# Patient Record
Sex: FEMALE | Race: WHITE | NOT HISPANIC OR LATINO
[De-identification: names, ages, dates, MRNs, and addresses within clinical notes are randomized per-mention and may not be internally consistent; named-entity substitution may affect disease eponyms.]

---

## 2020-01-07 ENCOUNTER — RESULT REVIEW (OUTPATIENT)
Age: 40
End: 2020-01-07

## 2021-02-10 ENCOUNTER — APPOINTMENT (OUTPATIENT)
Dept: ANTEPARTUM | Facility: CLINIC | Age: 41
End: 2021-02-10
Payer: COMMERCIAL

## 2021-02-10 ENCOUNTER — ASOB RESULT (OUTPATIENT)
Age: 41
End: 2021-02-10

## 2021-02-10 PROBLEM — Z00.00 ENCOUNTER FOR PREVENTIVE HEALTH EXAMINATION: Status: ACTIVE | Noted: 2021-02-10

## 2021-02-10 PROCEDURE — 76811 OB US DETAILED SNGL FETUS: CPT

## 2021-02-10 PROCEDURE — 99072 ADDL SUPL MATRL&STAF TM PHE: CPT

## 2021-04-07 ENCOUNTER — APPOINTMENT (OUTPATIENT)
Dept: ANTEPARTUM | Facility: CLINIC | Age: 41
End: 2021-04-07

## 2021-04-16 ENCOUNTER — ASOB RESULT (OUTPATIENT)
Age: 41
End: 2021-04-16

## 2021-04-16 ENCOUNTER — APPOINTMENT (OUTPATIENT)
Dept: ANTEPARTUM | Facility: CLINIC | Age: 41
End: 2021-04-16
Payer: COMMERCIAL

## 2021-04-16 PROCEDURE — 76818 FETAL BIOPHYS PROFILE W/NST: CPT

## 2021-04-16 PROCEDURE — 99072 ADDL SUPL MATRL&STAF TM PHE: CPT

## 2021-04-29 ENCOUNTER — APPOINTMENT (OUTPATIENT)
Dept: ANTEPARTUM | Facility: CLINIC | Age: 41
End: 2021-04-29
Payer: COMMERCIAL

## 2021-04-29 ENCOUNTER — ASOB RESULT (OUTPATIENT)
Age: 41
End: 2021-04-29

## 2021-04-29 PROCEDURE — 76816 OB US FOLLOW-UP PER FETUS: CPT

## 2021-04-29 PROCEDURE — 99072 ADDL SUPL MATRL&STAF TM PHE: CPT

## 2021-04-29 PROCEDURE — 99212 OFFICE O/P EST SF 10 MIN: CPT | Mod: 25

## 2021-07-05 ENCOUNTER — TRANSCRIPTION ENCOUNTER (OUTPATIENT)
Age: 41
End: 2021-07-05

## 2021-07-05 ENCOUNTER — OUTPATIENT (OUTPATIENT)
Dept: OUTPATIENT SERVICES | Facility: HOSPITAL | Age: 41
LOS: 1 days | End: 2021-07-05
Payer: COMMERCIAL

## 2021-07-05 DIAGNOSIS — Z11.52 ENCOUNTER FOR SCREENING FOR COVID-19: ICD-10-CM

## 2021-07-05 LAB — SARS-COV-2 RNA SPEC QL NAA+PROBE: SIGNIFICANT CHANGE UP

## 2021-07-05 PROCEDURE — C9803: CPT

## 2021-07-05 PROCEDURE — U0003: CPT

## 2021-07-05 PROCEDURE — U0005: CPT

## 2021-07-06 ENCOUNTER — INPATIENT (INPATIENT)
Facility: HOSPITAL | Age: 41
LOS: 1 days | Discharge: HOME | End: 2021-07-08
Attending: OBSTETRICS & GYNECOLOGY | Admitting: OBSTETRICS & GYNECOLOGY
Payer: COMMERCIAL

## 2021-07-06 VITALS — HEART RATE: 108 BPM | DIASTOLIC BLOOD PRESSURE: 82 MMHG | SYSTOLIC BLOOD PRESSURE: 125 MMHG

## 2021-07-06 DIAGNOSIS — O36.63X0 MATERNAL CARE FOR EXCESSIVE FETAL GROWTH, THIRD TRIMESTER, NOT APPLICABLE OR UNSPECIFIED: ICD-10-CM

## 2021-07-06 LAB
BASOPHILS # BLD AUTO: 0.02 K/UL — SIGNIFICANT CHANGE UP (ref 0–0.2)
BASOPHILS NFR BLD AUTO: 0.2 % — SIGNIFICANT CHANGE UP (ref 0–2)
BLD GP AB SCN SERPL QL: NEGATIVE — SIGNIFICANT CHANGE UP
EOSINOPHIL # BLD AUTO: 0.02 K/UL — SIGNIFICANT CHANGE UP (ref 0–0.5)
EOSINOPHIL NFR BLD AUTO: 0.2 % — SIGNIFICANT CHANGE UP (ref 0–6)
HCT VFR BLD CALC: 34 % — LOW (ref 34.5–45)
HGB BLD-MCNC: 11.1 G/DL — LOW (ref 11.5–15.5)
IMM GRANULOCYTES NFR BLD AUTO: 1.2 % — SIGNIFICANT CHANGE UP (ref 0–1.5)
LYMPHOCYTES # BLD AUTO: 1.67 K/UL — SIGNIFICANT CHANGE UP (ref 1–3.3)
LYMPHOCYTES # BLD AUTO: 18.6 % — SIGNIFICANT CHANGE UP (ref 13–44)
MCHC RBC-ENTMCNC: 29.6 PG — SIGNIFICANT CHANGE UP (ref 27–34)
MCHC RBC-ENTMCNC: 32.6 GM/DL — SIGNIFICANT CHANGE UP (ref 32–36)
MCV RBC AUTO: 90.7 FL — SIGNIFICANT CHANGE UP (ref 80–100)
MONOCYTES # BLD AUTO: 0.52 K/UL — SIGNIFICANT CHANGE UP (ref 0–0.9)
MONOCYTES NFR BLD AUTO: 5.8 % — SIGNIFICANT CHANGE UP (ref 2–14)
NEUTROPHILS # BLD AUTO: 6.63 K/UL — SIGNIFICANT CHANGE UP (ref 1.8–7.4)
NEUTROPHILS NFR BLD AUTO: 74 % — SIGNIFICANT CHANGE UP (ref 43–77)
NRBC # BLD: 0 /100 WBCS — SIGNIFICANT CHANGE UP (ref 0–0)
PLATELET # BLD AUTO: 219 K/UL — SIGNIFICANT CHANGE UP (ref 150–400)
RBC # BLD: 3.75 M/UL — LOW (ref 3.8–5.2)
RBC # FLD: 15.9 % — HIGH (ref 10.3–14.5)
RH IG SCN BLD-IMP: POSITIVE — SIGNIFICANT CHANGE UP
WBC # BLD: 8.97 K/UL — SIGNIFICANT CHANGE UP (ref 3.8–10.5)
WBC # FLD AUTO: 8.97 K/UL — SIGNIFICANT CHANGE UP (ref 3.8–10.5)

## 2021-07-06 PROCEDURE — 59514 CESAREAN DELIVERY ONLY: CPT | Mod: AS,U9

## 2021-07-06 RX ORDER — CITRIC ACID/SODIUM CITRATE 300-500 MG
15 SOLUTION, ORAL ORAL ONCE
Refills: 0 | Status: COMPLETED | OUTPATIENT
Start: 2021-07-06 | End: 2021-07-06

## 2021-07-06 RX ORDER — OXYCODONE HYDROCHLORIDE 5 MG/1
5 TABLET ORAL ONCE
Refills: 0 | Status: DISCONTINUED | OUTPATIENT
Start: 2021-07-06 | End: 2021-07-08

## 2021-07-06 RX ORDER — HEPARIN SODIUM 5000 [USP'U]/ML
5000 INJECTION INTRAVENOUS; SUBCUTANEOUS EVERY 12 HOURS
Refills: 0 | Status: DISCONTINUED | OUTPATIENT
Start: 2021-07-06 | End: 2021-07-08

## 2021-07-06 RX ORDER — ACETAMINOPHEN 500 MG
975 TABLET ORAL
Refills: 0 | Status: DISCONTINUED | OUTPATIENT
Start: 2021-07-06 | End: 2021-07-08

## 2021-07-06 RX ORDER — OXYCODONE HYDROCHLORIDE 5 MG/1
5 TABLET ORAL
Refills: 0 | Status: DISCONTINUED | OUTPATIENT
Start: 2021-07-06 | End: 2021-07-08

## 2021-07-06 RX ORDER — SODIUM CHLORIDE 9 MG/ML
1000 INJECTION, SOLUTION INTRAVENOUS ONCE
Refills: 0 | Status: DISCONTINUED | OUTPATIENT
Start: 2021-07-06 | End: 2021-07-06

## 2021-07-06 RX ORDER — OXYTOCIN 10 UNIT/ML
333.33 VIAL (ML) INJECTION
Qty: 20 | Refills: 0 | Status: DISCONTINUED | OUTPATIENT
Start: 2021-07-06 | End: 2021-07-08

## 2021-07-06 RX ORDER — KETOROLAC TROMETHAMINE 30 MG/ML
30 SYRINGE (ML) INJECTION EVERY 6 HOURS
Refills: 0 | Status: COMPLETED | OUTPATIENT
Start: 2021-07-06 | End: 2021-07-08

## 2021-07-06 RX ORDER — TETANUS TOXOID, REDUCED DIPHTHERIA TOXOID AND ACELLULAR PERTUSSIS VACCINE, ADSORBED 5; 2.5; 8; 8; 2.5 [IU]/.5ML; [IU]/.5ML; UG/.5ML; UG/.5ML; UG/.5ML
0.5 SUSPENSION INTRAMUSCULAR ONCE
Refills: 0 | Status: DISCONTINUED | OUTPATIENT
Start: 2021-07-06 | End: 2021-07-08

## 2021-07-06 RX ORDER — IBUPROFEN 200 MG
600 TABLET ORAL EVERY 6 HOURS
Refills: 0 | Status: COMPLETED | OUTPATIENT
Start: 2021-07-06 | End: 2022-06-04

## 2021-07-06 RX ORDER — MAGNESIUM HYDROXIDE 400 MG/1
30 TABLET, CHEWABLE ORAL
Refills: 0 | Status: DISCONTINUED | OUTPATIENT
Start: 2021-07-06 | End: 2021-07-08

## 2021-07-06 RX ORDER — MORPHINE SULFATE 50 MG/1
0.1 CAPSULE, EXTENDED RELEASE ORAL ONCE
Refills: 0 | Status: DISCONTINUED | OUTPATIENT
Start: 2021-07-06 | End: 2021-07-07

## 2021-07-06 RX ORDER — SODIUM CHLORIDE 9 MG/ML
1000 INJECTION, SOLUTION INTRAVENOUS
Refills: 0 | Status: DISCONTINUED | OUTPATIENT
Start: 2021-07-06 | End: 2021-07-08

## 2021-07-06 RX ORDER — SODIUM CHLORIDE 9 MG/ML
1000 INJECTION, SOLUTION INTRAVENOUS
Refills: 0 | Status: DISCONTINUED | OUTPATIENT
Start: 2021-07-06 | End: 2021-07-06

## 2021-07-06 RX ORDER — LANOLIN
1 OINTMENT (GRAM) TOPICAL EVERY 6 HOURS
Refills: 0 | Status: DISCONTINUED | OUTPATIENT
Start: 2021-07-06 | End: 2021-07-08

## 2021-07-06 RX ORDER — SIMETHICONE 80 MG/1
80 TABLET, CHEWABLE ORAL EVERY 4 HOURS
Refills: 0 | Status: DISCONTINUED | OUTPATIENT
Start: 2021-07-06 | End: 2021-07-08

## 2021-07-06 RX ORDER — OXYTOCIN 10 UNIT/ML
333.33 VIAL (ML) INJECTION
Qty: 20 | Refills: 0 | Status: DISCONTINUED | OUTPATIENT
Start: 2021-07-06 | End: 2021-07-06

## 2021-07-06 RX ORDER — DIPHENHYDRAMINE HCL 50 MG
25 CAPSULE ORAL EVERY 6 HOURS
Refills: 0 | Status: DISCONTINUED | OUTPATIENT
Start: 2021-07-06 | End: 2021-07-08

## 2021-07-06 RX ORDER — FAMOTIDINE 10 MG/ML
20 INJECTION INTRAVENOUS ONCE
Refills: 0 | Status: COMPLETED | OUTPATIENT
Start: 2021-07-06 | End: 2021-07-06

## 2021-07-06 RX ADMIN — Medication 975 MILLIGRAM(S): at 22:01

## 2021-07-06 RX ADMIN — Medication 975 MILLIGRAM(S): at 22:45

## 2021-07-06 RX ADMIN — SODIUM CHLORIDE 125 MILLILITER(S): 9 INJECTION, SOLUTION INTRAVENOUS at 14:12

## 2021-07-06 RX ADMIN — FAMOTIDINE 20 MILLIGRAM(S): 10 INJECTION INTRAVENOUS at 14:12

## 2021-07-06 RX ADMIN — Medication 15 MILLILITER(S): at 14:12

## 2021-07-06 NOTE — OB PROVIDER H&P - PROBLEM SELECTOR PLAN 1
admit Guero&D  iv fluids  routine labs  npo bicitra  efm toco  anesthesia consult  abd prep and shave  graham to gravity   c.s for delivery  Dr Briceno in house aware

## 2021-07-06 NOTE — OB PROVIDER H&P - NSMATERNALFETALCONCERNS_OBGYN_ALL_OB_FT
Fetal Alert  5/30/21 Fetal pelvic mass, 2.89x2.29x3.45cm with a septation, female fetus, right hydronephrosis 1.4cm, left kidney normal.  Notify peds at delivery, will need evaluation of pelvic mass prior to hospital discharge. -Anne-Marie Lanza, RNC

## 2021-07-06 NOTE — OB RN DELIVERY SUMMARY - NSSELHIDDEN_OBGYN_ALL_OB_FT
[NS_DeliveryAttending1_OBGYN_ALL_OB_FT:[id number]] [NS_DeliveryAttending1_OBGYN_ALL_OB_FT:[id number]==],[NS_DeliveryAssist1_OBGYN_ALL_OB_FT:[id number]]

## 2021-07-06 NOTE — PRE-ANESTHESIA EVALUATION ADULT - NSANTHADDINFOFT_GEN_ALL_CORE
Spinal, duramorph explained to pt in detail;  risks include but not limited to HA, failure, nv injury.  All questions answered.

## 2021-07-06 NOTE — OB PROVIDER H&P - NSHPPHYSICALEXAM_GEN_ALL_CORE
Vital Signs Last 24 Hrs  T(C): --  T(F): --  HR: 112 (06 Jul 2021 12:26) (108 - 112)  BP: 125/82 (06 Jul 2021 12:12) (125/82 - 125/82)  BP(mean): --  RR: --  SpO2: 98% (06 Jul 2021 12:26) (98% - 99%)    gen- a&ox3 well developed well nourished nad  cv- rrr s1 s2 lungs cta bl  abd gravid soft  ext nonedemetous/nontender
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
## 2021-07-06 NOTE — OB RN INTRAOPERATIVE NOTE - NSSELHIDDEN_OBGYN_ALL_OB_FT
[NS_DeliveryAttending1_OBGYN_ALL_OB_FT:[id number]==] [NS_DeliveryAttending1_OBGYN_ALL_OB_FT:[id number]==],[NS_DeliveryAssist1_OBGYN_ALL_OB_FT:[id number]=]

## 2021-07-06 NOTE — OB NEONATOLOGY/PEDIATRICIAN DELIVERY SUMMARY - NSPEDSNEONOTESA_OBGYN_ALL_OB_FT
Pt called states that Seroquel is $720 does not have insurance   Is there anything else that can be called in that is more cost efficient for pt? Pediatrician called to delivery for primary  in the setting of fetal macrosomia. Female infant born at 40.6 wks via  to a 37 y/o  blood type O+ mother. Maternal history of SVT (not on medications) and GERD. No significant prenatal history reported. Prenatal labs nr/immune/-, GBS - on 6/3/21. ROM at birth on 21 with clear fluids. Two nuchal cords and no true knots. Baby emerged vigorous, crying. Cord clamping delayed 30sec. Infant was brought to radiant warmer and warmed, dried, stimulated and suctioned. HR>100, normal respiratory effort. APGARS of 8/8. Clear fluids were suctioned from infant’s pharynx x4 throughout resuscitation efforts. CPAP (21% FiO2 and 5cmH2O) was initiated ~5min of life for poor color and SpO2 in the 60-70’s. FiO2 was increased to 30% and 40% at ~7min and ~10min of life, respectively. Trial to decrease FiO2 initiated at ~14min of life was unsuccessful, with SpO2 in the low 80’s and increased work of breathing. Infant will be admitted to NICU for observation.     Loni Gaxiola, NICU NNP, was present and took the infant.     Mom is initiating breast feeding. Defers Hepatitis B vaccination. EOS score N/A as not labor and no ROM. Pediatrician called to delivery for primary  in the setting of fetal macrosomia. Female infant born at 40.6 wks via  to a 39 y/o  blood type O+ mother. Maternal history of SVT (not on medications) and GERD. On fetal U/S, pelvic mass with a septation (2.89x2.29x3.45cm), right hydronephrosis (1.4cm), left kidney normal. Will need evaluation of pelvic mass prior to hospital discharge. Prenatal labs nr/immune/-, GBS - on 6/3/21. ROM at birth on 21 with clear fluids. Two nuchal cords and no true knots. Baby emerged vigorous, crying. Cord clamping delayed 30sec. Infant was brought to radiant warmer and warmed, dried, stimulated and suctioned. HR>100, normal respiratory effort. APGARS of 8/8. Clear fluids were suctioned from infant’s pharynx x4 throughout resuscitation efforts. CPAP (21% FiO2 and 5cmH2O) was initiated ~5min of life for poor color and SpO2 in the 60-70’s. FiO2 was increased to 30% and 40% at ~7min and ~10min of life, respectively. Trial to decrease FiO2 initiated at ~14min of life was unsuccessful, with SpO2 in the low 80’s and increased work of breathing. Infant will be admitted to NICU for observation.     Loni Gaxiola, NICU NNP, was present and took the infant.     Mom is initiating breast feeding. Defers Hepatitis B vaccination. EOS score N/A as not labor and no ROM.

## 2021-07-06 NOTE — BRIEF OPERATIVE NOTE - OPERATION/FINDINGS
normal tubes, ovaries, and uterus  girl - apgars 8/8 stable to nicu  cephalic, clear fluid  9lb 6oz  EBL 1L  IVF 1.8L  uo 250cc clear yellow urine  surgicell powder along serosal edge of bladder flap

## 2021-07-06 NOTE — OB PROVIDER DELIVERY SUMMARY - NSPROVIDERDELIVERYNOTE_OBGYN_ALL_OB_FT
uncomplicated primary section  normal uterus, tubes and ovaries  girl = NICU stable apgars 8/8, 9lb 6oz. nuchal x2 reduced  EBL 1L  IVF 1.8L  uo 250cc clear yellow urine  2 grams ancef

## 2021-07-06 NOTE — OB PROVIDER H&P - ATTENDING COMMENTS
P1 at 40 + weeks with presumed macrosomia  on Leopolds 9lb 7oz  declined IOL and preferred sections  risks of surgery reviewed including not limited to bleeding infection/organ damage/transfusion/hyst/scar tissue    consents obtained  fetal status has been reassuring

## 2021-07-06 NOTE — OB NEONATOLOGY/PEDIATRICIAN DELIVERY SUMMARY - NSMATERNALFETALCONCERNS_OBGYN_ALL_OB_FT
Fetal Alert  5/30/21 Fetal pelvic mass, 2.89x2.29x3.45cm with a septation, female fetus, right hydronephrosis 1.4cm, left kidney normal.  Notify peds at delivery, will need evaluation of pelvic mass prior to hospital discharge. -Anne-Marie Lanza, RNC    Macrosomia

## 2021-07-06 NOTE — OB PROVIDER H&P - HISTORY OF PRESENT ILLNESS
39 yo  @40.6 weeks presenting for scheduled primary elective c/s pt denies reg cxns/lof/vb. pt report spos fm. pnc complicated by Shingles outbreak treated with Valtrex. last dose 21  efw by US 9#  allergies- nkda  meds- PNV  med hx- SVT - Dr Rocael Arechiga- cardiologist. last echo/ekg/stress test 2 years ago.    acid reflux  pt denies pulm/heme/neuro/psych    ob hx- current as above.   gyn hx- hx of ovarian cyst - denies tmt, hx abnl pap > 10 years ago with colposcopy WNL. pos HPV  sx hx- endoscopy - denies reaction to anesthesia  fam hx- mother > 49 yo Bt Ca, father- brain ca  social hx- denies etoh/tob/illicit drug use

## 2021-07-07 LAB
BASOPHILS # BLD AUTO: 0.01 K/UL — SIGNIFICANT CHANGE UP (ref 0–0.2)
BASOPHILS NFR BLD AUTO: 0.1 % — SIGNIFICANT CHANGE UP (ref 0–2)
COVID-19 SPIKE DOMAIN AB INTERP: NEGATIVE — SIGNIFICANT CHANGE UP
COVID-19 SPIKE DOMAIN ANTIBODY RESULT: 0.4 U/ML — SIGNIFICANT CHANGE UP
EOSINOPHIL # BLD AUTO: 0 K/UL — SIGNIFICANT CHANGE UP (ref 0–0.5)
EOSINOPHIL NFR BLD AUTO: 0 % — SIGNIFICANT CHANGE UP (ref 0–6)
HCT VFR BLD CALC: 28.7 % — LOW (ref 34.5–45)
HGB BLD-MCNC: 9.3 G/DL — LOW (ref 11.5–15.5)
IMM GRANULOCYTES NFR BLD AUTO: 0.4 % — SIGNIFICANT CHANGE UP (ref 0–1.5)
LYMPHOCYTES # BLD AUTO: 1.34 K/UL — SIGNIFICANT CHANGE UP (ref 1–3.3)
LYMPHOCYTES # BLD AUTO: 11 % — LOW (ref 13–44)
MCHC RBC-ENTMCNC: 30 PG — SIGNIFICANT CHANGE UP (ref 27–34)
MCHC RBC-ENTMCNC: 32.4 GM/DL — SIGNIFICANT CHANGE UP (ref 32–36)
MCV RBC AUTO: 92.6 FL — SIGNIFICANT CHANGE UP (ref 80–100)
MONOCYTES # BLD AUTO: 0.51 K/UL — SIGNIFICANT CHANGE UP (ref 0–0.9)
MONOCYTES NFR BLD AUTO: 4.2 % — SIGNIFICANT CHANGE UP (ref 2–14)
NEUTROPHILS # BLD AUTO: 10.24 K/UL — HIGH (ref 1.8–7.4)
NEUTROPHILS NFR BLD AUTO: 84.3 % — HIGH (ref 43–77)
NRBC # BLD: 0 /100 WBCS — SIGNIFICANT CHANGE UP (ref 0–0)
PLATELET # BLD AUTO: 219 K/UL — SIGNIFICANT CHANGE UP (ref 150–400)
RBC # BLD: 3.1 M/UL — LOW (ref 3.8–5.2)
RBC # FLD: 16 % — HIGH (ref 10.3–14.5)
SARS-COV-2 IGG+IGM SERPL QL IA: 0.4 U/ML — SIGNIFICANT CHANGE UP
SARS-COV-2 IGG+IGM SERPL QL IA: NEGATIVE — SIGNIFICANT CHANGE UP
T PALLIDUM AB TITR SER: NEGATIVE — SIGNIFICANT CHANGE UP
WBC # BLD: 12.15 K/UL — HIGH (ref 3.8–10.5)
WBC # FLD AUTO: 12.15 K/UL — HIGH (ref 3.8–10.5)

## 2021-07-07 RX ORDER — IBUPROFEN 200 MG
600 TABLET ORAL EVERY 6 HOURS
Refills: 0 | Status: DISCONTINUED | OUTPATIENT
Start: 2021-07-07 | End: 2021-07-08

## 2021-07-07 RX ADMIN — HEPARIN SODIUM 5000 UNIT(S): 5000 INJECTION INTRAVENOUS; SUBCUTANEOUS at 00:32

## 2021-07-07 RX ADMIN — Medication 975 MILLIGRAM(S): at 02:32

## 2021-07-07 RX ADMIN — Medication 975 MILLIGRAM(S): at 21:25

## 2021-07-07 RX ADMIN — Medication 30 MILLIGRAM(S): at 06:35

## 2021-07-07 RX ADMIN — Medication 30 MILLIGRAM(S): at 01:15

## 2021-07-07 RX ADMIN — Medication 975 MILLIGRAM(S): at 20:55

## 2021-07-07 RX ADMIN — Medication 30 MILLIGRAM(S): at 12:40

## 2021-07-07 RX ADMIN — Medication 975 MILLIGRAM(S): at 15:50

## 2021-07-07 RX ADMIN — Medication 30 MILLIGRAM(S): at 00:33

## 2021-07-07 RX ADMIN — Medication 30 MILLIGRAM(S): at 18:20

## 2021-07-07 RX ADMIN — Medication 30 MILLIGRAM(S): at 11:45

## 2021-07-07 RX ADMIN — Medication 975 MILLIGRAM(S): at 09:21

## 2021-07-07 RX ADMIN — Medication 30 MILLIGRAM(S): at 17:30

## 2021-07-07 RX ADMIN — HEPARIN SODIUM 5000 UNIT(S): 5000 INJECTION INTRAVENOUS; SUBCUTANEOUS at 11:45

## 2021-07-07 RX ADMIN — Medication 975 MILLIGRAM(S): at 14:57

## 2021-07-07 RX ADMIN — Medication 975 MILLIGRAM(S): at 03:10

## 2021-07-07 RX ADMIN — Medication 30 MILLIGRAM(S): at 06:06

## 2021-07-07 RX ADMIN — Medication 975 MILLIGRAM(S): at 10:20

## 2021-07-08 ENCOUNTER — TRANSCRIPTION ENCOUNTER (OUTPATIENT)
Age: 41
End: 2021-07-08

## 2021-07-08 VITALS
DIASTOLIC BLOOD PRESSURE: 58 MMHG | HEART RATE: 100 BPM | TEMPERATURE: 98 F | OXYGEN SATURATION: 98 % | SYSTOLIC BLOOD PRESSURE: 109 MMHG | RESPIRATION RATE: 18 BRPM

## 2021-07-08 PROCEDURE — 59025 FETAL NON-STRESS TEST: CPT

## 2021-07-08 PROCEDURE — 59050 FETAL MONITOR W/REPORT: CPT

## 2021-07-08 PROCEDURE — 86780 TREPONEMA PALLIDUM: CPT

## 2021-07-08 PROCEDURE — 85025 COMPLETE CBC W/AUTO DIFF WBC: CPT

## 2021-07-08 PROCEDURE — 86769 SARS-COV-2 COVID-19 ANTIBODY: CPT

## 2021-07-08 PROCEDURE — 86850 RBC ANTIBODY SCREEN: CPT

## 2021-07-08 PROCEDURE — C1889: CPT

## 2021-07-08 PROCEDURE — 86901 BLOOD TYPING SEROLOGIC RH(D): CPT

## 2021-07-08 PROCEDURE — 86900 BLOOD TYPING SEROLOGIC ABO: CPT

## 2021-07-08 RX ORDER — ACETAMINOPHEN 500 MG
3 TABLET ORAL
Qty: 0 | Refills: 0 | DISCHARGE
Start: 2021-07-08

## 2021-07-08 RX ORDER — IBUPROFEN 200 MG
1 TABLET ORAL
Qty: 0 | Refills: 0 | DISCHARGE
Start: 2021-07-08

## 2021-07-08 RX ADMIN — Medication 975 MILLIGRAM(S): at 09:06

## 2021-07-08 RX ADMIN — Medication 600 MILLIGRAM(S): at 00:51

## 2021-07-08 RX ADMIN — HEPARIN SODIUM 5000 UNIT(S): 5000 INJECTION INTRAVENOUS; SUBCUTANEOUS at 00:21

## 2021-07-08 RX ADMIN — Medication 600 MILLIGRAM(S): at 13:03

## 2021-07-08 RX ADMIN — Medication 600 MILLIGRAM(S): at 00:21

## 2021-07-08 NOTE — DISCHARGE NOTE OB - CARE PROVIDER_API CALL
Lorraine Briceno)  Obstetrics and Gynecology  [address], [address]  [place] [id number]  Phone: [phone]  Fax: [phone]  Follow Up Time:

## 2021-07-08 NOTE — PROGRESS NOTE ADULT - ATTENDING COMMENTS
Pt seen on AM rounds and resident note reviewed    Pt feels sore but ambulating, tolerating po    VSS AF  fundus firm, NT   slightly/softly distended  ext: trace edema,. no cords    A/P: POD 2 s/p primary  delivery, stable for discharge  -routine care  follow up in 1 week in office  Rosendo Wu MD
POD1 s/p 1'  section, doing well  -Routine postoperative care   -CBC not done, nursing made aware and will have it drawn  -Reg diet   -MONSTER Matt, DO

## 2021-07-08 NOTE — DISCHARGE NOTE OB - CARE PLAN
Principal Discharge DX:	 delivery delivered  Goal:	recover from surgery  Assessment and plan of treatment:	POD 2 s/p  , asymptomatic surgical blood loss anemia, stable for discharge

## 2021-07-08 NOTE — PROGRESS NOTE ADULT - ASSESSMENT
A/P: 39yo POD#2 s/p LTCS.  Patient is stable and doing well post-operatively.    - Continue regular diet.  - Increase ambulation.  - Continue motrin, tylenol, oxycodone PRN for pain control    MUSTAPHA Reyes PGY-1
  A/P: 39yo POD#1 s/p LTCS.  Patient is stable and doing well post-operatively.    - Continue regular diet.  - Increase ambulation.  - Continue motrin, tylenol, oxycodone PRN for pain control  - F/u AM CBC    MUSTAPHA Reyes  PGY-1

## 2021-07-08 NOTE — DISCHARGE NOTE OB - HOSPITAL COURSE
Patient was admitted to deliver her full term baby. She had a  delivery of female infant. She is stable for discharge on POD 2. She has asymptomatic anemia and will treat with iron supplementation.

## 2021-07-08 NOTE — DISCHARGE NOTE OB - PATIENT PORTAL LINK FT
You can access the FollowMyHealth Patient Portal offered by [facility] by registering at the following website: [email or web address]. By joining [facility]’s FollowMyHealth portal, you will also be able to view your health information using other applications (apps) compatible with our system.

## 2021-07-08 NOTE — DISCHARGE NOTE OB - CARE PROVIDERS DIRECT ADDRESSES
[email or web address]

## 2021-07-08 NOTE — PROGRESS NOTE ADULT - SUBJECTIVE AND OBJECTIVE BOX
Day 1 of Anesthesia Pain Management Service    SUBJECTIVE:  Pain Scale Score:          [X] Refer to charted pain scores    THERAPY: Received PF spinal morphine as above    OBJECTIVE:    Sedation:        	[X] Alert	[ ] Drowsy	[ ] Arousable      [ ] Asleep       [ ] Unresponsive    Side Effects:	[X] None	[ ] Nausea	[ ] Vomiting         [ ] Pruritus  		[ ] Weakness            [ ] Numbness	          [ ] Other:    ASSESSMENT/ PLAN  [X] Patient transitioned to prn analgesics  [X] Pain management per primary service, pain service to sign off   [X]Documentation and Verification of current medications
Day 1 of Anesthesia Pain Management Service    SUBJECTIVE: Doing ok  Pain Scale Score:          [X] Refer to charted pain scores    THERAPY: s/p  100 mcg PF morphine on 7\6\2021      MEDICATIONS  (STANDING):  acetaminophen   Tablet .. 975 milliGRAM(s) Oral <User Schedule>  diphtheria/tetanus/pertussis (acellular) Vaccine (ADAcel) 0.5 milliLiter(s) IntraMuscular once  heparin   Injectable 5000 Unit(s) SubCutaneous every 12 hours  ibuprofen  Tablet. 600 milliGRAM(s) Oral every 6 hours  ketorolac   Injectable 30 milliGRAM(s) IV Push every 6 hours  lactated ringers. 1000 milliLiter(s) (125 mL/Hr) IV Continuous <Continuous>  morphine PF Spinal 0.1 milliGRAM(s) IntraThecal. once  oxytocin Infusion 333.333 milliUNIT(s)/Min (1000 mL/Hr) IV Continuous <Continuous>    MEDICATIONS  (PRN):  diphenhydrAMINE 25 milliGRAM(s) Oral every 6 hours PRN Pruritus  lanolin Ointment 1 Application(s) Topical every 6 hours PRN Sore Nipples  magnesium hydroxide Suspension 30 milliLiter(s) Oral two times a day PRN Constipation  oxyCODONE    IR 5 milliGRAM(s) Oral every 3 hours PRN Moderate to Severe Pain (4-10)  oxyCODONE    IR 5 milliGRAM(s) Oral once PRN Moderate to Severe Pain (4-10)  simethicone 80 milliGRAM(s) Chew every 4 hours PRN Gas      OBJECTIVE:    Sedation:        	[X] Alert	 [ ] Drowsy	[ ] Arousable      [ ] Asleep       [ ] Unresponsive    Side Effects:	[ ] None 	[ ] Nausea	[ ] Vomiting         [X ] Pruritus  		[ ] Weakness            [ ] Numbness	          [ ] Other:    Vital Signs Last 24 Hrs  T(C): 36.6 (07 Jul 2021 09:15), Max: 37 (07 Jul 2021 01:30)  T(F): 97.8 (07 Jul 2021 09:15), Max: 98.6 (07 Jul 2021 01:30)  HR: 110 (07 Jul 2021 09:15) (76 - 124)  BP: 112/59 (07 Jul 2021 09:15) (110/70 - 138/98)  BP(mean): 87 (06 Jul 2021 19:50) (85 - 92)  RR: 18 (07 Jul 2021 09:15) (14 - 27)  SpO2: 98% (07 Jul 2021 09:15) (96% - 100%)    ASSESSMENT/ PLAN  [X] Patient to be transitioned to prn analgesics after 24 hours  [X] Pain management per primary service, pain service to sign off   [X]Documentation and Verification of current medications
OB Progress Note:  Delivery, POD#1    S: 41yo POD#1 s/p LTCS . Her pain is well controlled. She is tolerating a regular diet and passing flatus. Denies N/V. Denies CP/SOB/lightheadedness/dizziness.   She is ambulating without difficulty.   Voiding spontanously.     O:   Vital Signs Last 24 Hrs  T(C): 36.5 (2021 06:00), Max: 37 (2021 01:30)  T(F): 97.7 (2021 06:00), Max: 98.6 (2021 01:30)  HR: 80 (2021 06:00) (76 - 124)  BP: 110/70 (2021 06:00) (110/70 - 138/98)  BP(mean): 87 (2021 19:50) (85 - 92)  RR: 18 (2021 06:00) (14 - 27)  SpO2: 98% (2021 06:00) (96% - 100%)    Labs:  Blood type: O Positive  Rubella IgG: RPR: Negative                          11.1<L>   8.97 >-----------< 219    (  @ 13:10 )             34.0<L>                  acetaminophen   Tablet .. 975 milliGRAM(s) Oral <User Schedule>  diphenhydrAMINE 25 milliGRAM(s) Oral every 6 hours PRN  diphtheria/tetanus/pertussis (acellular) Vaccine (ADAcel) 0.5 milliLiter(s) IntraMuscular once  heparin   Injectable 5000 Unit(s) SubCutaneous every 12 hours  ibuprofen  Tablet. 600 milliGRAM(s) Oral every 6 hours  ketorolac   Injectable 30 milliGRAM(s) IV Push every 6 hours  lactated ringers. 1000 milliLiter(s) IV Continuous <Continuous>  lanolin Ointment 1 Application(s) Topical every 6 hours PRN  magnesium hydroxide Suspension 30 milliLiter(s) Oral two times a day PRN  morphine PF Spinal 0.1 milliGRAM(s) IntraThecal. once  oxyCODONE    IR 5 milliGRAM(s) Oral every 3 hours PRN  oxyCODONE    IR 5 milliGRAM(s) Oral once PRN  oxytocin Infusion 333.333 milliUNIT(s)/Min IV Continuous <Continuous>  simethicone 80 milliGRAM(s) Chew every 4 hours PRN      PE:  General: NAD  Abdomen: Mildly distended, appropriately tender, incision c/d/i.  Extremities: No erythema, no pitting edema  
OB Progress Note: LTCS, POD#2    S: 39yo POD#2 s/p LTCS. Pain is well controlled. She is tolerating a regular diet and passing flatus. She is voiding spontaneously, and ambulating without difficulty. Denies CP/SOB. Denies lightheadedness/dizziness. Denies N/V.    O:  Vitals:  Vital Signs Last 24 Hrs  T(C): 36.5 (08 Jul 2021 05:55), Max: 36.9 (07 Jul 2021 12:52)  T(F): 97.7 (08 Jul 2021 05:55), Max: 98.5 (07 Jul 2021 12:52)  HR: 66 (08 Jul 2021 05:55) (66 - 110)  BP: 97/77 (08 Jul 2021 05:55) (97/77 - 117/73)  BP(mean): --  RR: 18 (08 Jul 2021 05:55) (18 - 18)  SpO2: 97% (08 Jul 2021 05:55) (97% - 99%)    MEDICATIONS  (STANDING):  acetaminophen   Tablet .. 975 milliGRAM(s) Oral <User Schedule>  diphtheria/tetanus/pertussis (acellular) Vaccine (ADAcel) 0.5 milliLiter(s) IntraMuscular once  heparin   Injectable 5000 Unit(s) SubCutaneous every 12 hours  ibuprofen  Tablet. 600 milliGRAM(s) Oral every 6 hours  ketorolac   Injectable 30 milliGRAM(s) IV Push every 6 hours  lactated ringers. 1000 milliLiter(s) (125 mL/Hr) IV Continuous <Continuous>  oxytocin Infusion 333.333 milliUNIT(s)/Min (1000 mL/Hr) IV Continuous <Continuous>      MEDICATIONS  (PRN):  diphenhydrAMINE 25 milliGRAM(s) Oral every 6 hours PRN Pruritus  lanolin Ointment 1 Application(s) Topical every 6 hours PRN Sore Nipples  magnesium hydroxide Suspension 30 milliLiter(s) Oral two times a day PRN Constipation  oxyCODONE    IR 5 milliGRAM(s) Oral every 3 hours PRN Moderate to Severe Pain (4-10)  oxyCODONE    IR 5 milliGRAM(s) Oral once PRN Moderate to Severe Pain (4-10)  simethicone 80 milliGRAM(s) Chew every 4 hours PRN Gas      Labs:  Blood type: O Positive  Rubella IgG: RPR: Negative                          9.3<L>   12.15<H> >-----------< 219    ( 07-07 @ 09:31 )             28.7<L>                        11.1<L>   8.97 >-----------< 219    ( 07-06 @ 13:10 )             34.0<L>                  PE:  General: NAD  Abdomen: Soft, appropriately tender, incision c/d/i.  Extremities: No erythema, no pitting edema

## 2022-07-13 NOTE — OB RN PATIENT PROFILE - HOW OFTEN DO YOU HAVE A DRINK CONTAINING ALCOHOL?
----- Message from Newton Oswald MA sent at 7/13/2022  3:46 PM CDT -----  Contact: patient  Patient called in and wanted to let Dr. Tompkins know that she did not have a good experience with Dr. Tyler at her appointment earlier today 7/13/22.      Patient would like a call back to discuss at [phone].     Never

## 2023-02-27 ENCOUNTER — APPOINTMENT (OUTPATIENT)
Dept: MAMMOGRAPHY | Facility: CLINIC | Age: 43
End: 2023-02-27
Payer: COMMERCIAL

## 2023-02-27 ENCOUNTER — TRANSCRIPTION ENCOUNTER (OUTPATIENT)
Age: 43
End: 2023-02-27

## 2023-02-27 PROCEDURE — 77067 SCR MAMMO BI INCL CAD: CPT

## 2023-02-27 PROCEDURE — 77063 BREAST TOMOSYNTHESIS BI: CPT

## 2024-02-28 ENCOUNTER — APPOINTMENT (OUTPATIENT)
Dept: MAMMOGRAPHY | Facility: CLINIC | Age: 44
End: 2024-02-28
Payer: COMMERCIAL

## 2024-02-28 PROCEDURE — 77063 BREAST TOMOSYNTHESIS BI: CPT

## 2024-02-28 PROCEDURE — 77067 SCR MAMMO BI INCL CAD: CPT

## 2025-05-08 ENCOUNTER — APPOINTMENT (OUTPATIENT)
Dept: MAMMOGRAPHY | Facility: CLINIC | Age: 45
End: 2025-05-08
Payer: COMMERCIAL

## 2025-05-08 PROCEDURE — 77063 BREAST TOMOSYNTHESIS BI: CPT

## 2025-05-08 PROCEDURE — 77067 SCR MAMMO BI INCL CAD: CPT
